# Patient Record
Sex: FEMALE | URBAN - METROPOLITAN AREA
[De-identification: names, ages, dates, MRNs, and addresses within clinical notes are randomized per-mention and may not be internally consistent; named-entity substitution may affect disease eponyms.]

---

## 2023-06-09 ENCOUNTER — APPOINTMENT (RX ONLY)
Dept: URBAN - METROPOLITAN AREA CLINIC 55 | Facility: CLINIC | Age: 33
Setting detail: DERMATOLOGY
End: 2023-06-09

## 2023-06-09 DIAGNOSIS — Z41.9 ENCOUNTER FOR PROCEDURE FOR PURPOSES OTHER THAN REMEDYING HEALTH STATE, UNSPECIFIED: ICD-10-CM

## 2023-06-09 PROCEDURE — ? COSMETIC CONSULTATION: GENERAL

## 2023-06-09 PROCEDURE — ? INVENTORY

## 2023-07-21 ENCOUNTER — APPOINTMENT (RX ONLY)
Dept: URBAN - METROPOLITAN AREA CLINIC 55 | Facility: CLINIC | Age: 33
Setting detail: DERMATOLOGY
End: 2023-07-21

## 2023-07-21 DIAGNOSIS — Z41.9 ENCOUNTER FOR PROCEDURE FOR PURPOSES OTHER THAN REMEDYING HEALTH STATE, UNSPECIFIED: ICD-10-CM

## 2023-07-21 PROCEDURE — ? INVENTORY

## 2023-07-21 PROCEDURE — ? PALOMAR VECTUS LASER HAIR REMOVAL

## 2023-07-21 NOTE — PROCEDURE: PALOMAR VECTUS LASER HAIR REMOVAL
Rate (Hz): Medium
Treatment Number: 0
Post-Care Instructions: I reviewed with the patient in detail post-care instructions. Patient should avoid the sun before and after treatment.
Fluence In J/Cm2: 17/26
Pre-Procedure: Prior to proceeding the treatment areas were cleaned and all present put on their eye protection.
Laser Type: diode 810nm
Consent obtained, risks reviewed.
Render Post-Care In The Note: No
Optic Size: 12 x 12mm
Post-Procedure Care: Immediate endpoint: perifollicular erythema and edema. Post care was reviewed with the patient and all patient questions were answered to their satisfaction.
Detail Level: Detailed

## 2023-09-13 ENCOUNTER — APPOINTMENT (RX ONLY)
Dept: URBAN - METROPOLITAN AREA CLINIC 55 | Facility: CLINIC | Age: 33
Setting detail: DERMATOLOGY
End: 2023-09-13

## 2023-09-13 DIAGNOSIS — Z41.9 ENCOUNTER FOR PROCEDURE FOR PURPOSES OTHER THAN REMEDYING HEALTH STATE, UNSPECIFIED: ICD-10-CM

## 2023-09-13 PROCEDURE — ? INVENTORY

## 2023-09-13 PROCEDURE — ? PALOMAR VECTUS LASER HAIR REMOVAL

## 2023-09-13 NOTE — PROCEDURE: PALOMAR VECTUS LASER HAIR REMOVAL
Optic Size: 12 x 12mm
Detail Level: Detailed
Render Post-Care In The Note: No
Pre-Procedure: Prior to proceeding the treatment areas were cleaned and all present put on their eye protection.
Consent obtained, risks reviewed.
External Cooling Fan Speed: 0
Post-Procedure Care: Immediate endpoint: perifollicular erythema and edema. Post care was reviewed with the patient and all patient questions were answered to their satisfaction.
Laser Type: diode 810nm
Rate (Hz): Medium
Fluence In J/Cm2: 17/26
Post-Care Instructions: I reviewed with the patient in detail post-care instructions. Patient should avoid the sun before and after treatment.

## 2023-10-27 ENCOUNTER — APPOINTMENT (RX ONLY)
Dept: URBAN - METROPOLITAN AREA CLINIC 55 | Facility: CLINIC | Age: 33
Setting detail: DERMATOLOGY
End: 2023-10-27

## 2023-10-27 DIAGNOSIS — Z41.9 ENCOUNTER FOR PROCEDURE FOR PURPOSES OTHER THAN REMEDYING HEALTH STATE, UNSPECIFIED: ICD-10-CM

## 2023-10-27 PROCEDURE — ? INVENTORY

## 2023-10-27 PROCEDURE — ? PALOMAR VECTUS LASER HAIR REMOVAL

## 2023-10-27 NOTE — PROCEDURE: PALOMAR VECTUS LASER HAIR REMOVAL
Treatment Number: 0
Fluence In J/Cm2: 14/21
Render Post-Care In The Note: No
Detail Level: Detailed
Consent obtained, risks reviewed.
Optic Size: 12 x 12mm
Rate (Hz): Medium
Pre-Procedure: Prior to proceeding the treatment areas were cleaned and all present put on their eye protection.
Laser Type: diode 810nm
Post-Procedure Care: Immediate endpoint: perifollicular erythema and edema. Post care was reviewed with the patient and all patient questions were answered to their satisfaction.
Post-Care Instructions: I reviewed with the patient in detail post-care instructions. Patient should avoid the sun before and after treatment.

## 2023-12-13 ENCOUNTER — APPOINTMENT (RX ONLY)
Dept: URBAN - METROPOLITAN AREA CLINIC 55 | Facility: CLINIC | Age: 33
Setting detail: DERMATOLOGY
End: 2023-12-13

## 2023-12-13 DIAGNOSIS — Z41.9 ENCOUNTER FOR PROCEDURE FOR PURPOSES OTHER THAN REMEDYING HEALTH STATE, UNSPECIFIED: ICD-10-CM

## 2023-12-13 PROCEDURE — ? INVENTORY

## 2023-12-13 PROCEDURE — ? PALOMAR VECTUS LASER HAIR REMOVAL

## 2023-12-13 NOTE — PROCEDURE: PALOMAR VECTUS LASER HAIR REMOVAL
Consent obtained, risks reviewed.
Treatment Number: 0
Pre-Procedure: Prior to proceeding the treatment areas were cleaned and all present put on their eye protection.
Rate (Hz): Medium
Detail Level: Detailed
Optic Size: 12 x 12mm
Laser Type: diode 810nm
Post-Care Instructions: I reviewed with the patient in detail post-care instructions. Patient should avoid the sun before and after treatment.
Post-Procedure Care: Immediate endpoint: perifollicular erythema and edema. Post care was reviewed with the patient and all patient questions were answered to their satisfaction.
Fluence In J/Cm2: 18/27
Render Post-Care In The Note: No

## 2024-01-18 ENCOUNTER — APPOINTMENT (RX ONLY)
Dept: URBAN - METROPOLITAN AREA CLINIC 55 | Facility: CLINIC | Age: 34
Setting detail: DERMATOLOGY
End: 2024-01-18

## 2024-01-18 DIAGNOSIS — Z41.9 ENCOUNTER FOR PROCEDURE FOR PURPOSES OTHER THAN REMEDYING HEALTH STATE, UNSPECIFIED: ICD-10-CM

## 2024-01-18 PROCEDURE — ? INVENTORY

## 2024-01-18 PROCEDURE — ? PALOMAR VECTUS LASER HAIR REMOVAL

## 2024-01-18 NOTE — PROCEDURE: PALOMAR VECTUS LASER HAIR REMOVAL
# Of Treatments In Package: 0
Fluence In J/Cm2: 18/27
Post-Procedure Care: Immediate endpoint: perifollicular erythema and edema. Post care was reviewed with the patient and all patient questions were answered to their satisfaction.
Post-Care Instructions: I reviewed with the patient in detail post-care instructions. Patient should avoid the sun before and after treatment.
Optic Size: 12 x 12mm
Rate (Hz): Medium
Laser Type: diode 810nm
Render Post-Care In The Note: No
Pre-Procedure: Prior to proceeding the treatment areas were cleaned and all present put on their eye protection.
Detail Level: Detailed
Consent obtained, risks reviewed.

## 2024-03-06 ENCOUNTER — APPOINTMENT (RX ONLY)
Dept: URBAN - METROPOLITAN AREA CLINIC 55 | Facility: CLINIC | Age: 34
Setting detail: DERMATOLOGY
End: 2024-03-06

## 2024-03-06 DIAGNOSIS — Z41.9 ENCOUNTER FOR PROCEDURE FOR PURPOSES OTHER THAN REMEDYING HEALTH STATE, UNSPECIFIED: ICD-10-CM

## 2024-03-06 PROCEDURE — ? INVENTORY

## 2024-03-06 PROCEDURE — ? PALOMAR VECTUS LASER HAIR REMOVAL

## 2024-03-06 NOTE — PROCEDURE: PALOMAR VECTUS LASER HAIR REMOVAL
Rate (Hz): Medium
Render Post-Care In The Note: No
Optic Size: 23 x 38mm
Laser Type: diode 810nm
Detail Level: Detailed
Pre-Procedure: Prior to proceeding the treatment areas were cleaned and all present put on their eye protection.
Consent obtained, risks reviewed.
Treatment Number: 0
Post-Care Instructions: I reviewed with the patient in detail post-care instructions. Patient should avoid the sun before and after treatment.
Fluence In J/Cm2: 54/12
Post-Procedure Care: Immediate endpoint: perifollicular erythema and edema. Post care was reviewed with the patient and all patient questions were answered to their satisfaction.

## 2024-08-30 ENCOUNTER — APPOINTMENT (RX ONLY)
Dept: URBAN - METROPOLITAN AREA CLINIC 55 | Facility: CLINIC | Age: 34
Setting detail: DERMATOLOGY
End: 2024-08-30

## 2024-08-30 DIAGNOSIS — Z41.9 ENCOUNTER FOR PROCEDURE FOR PURPOSES OTHER THAN REMEDYING HEALTH STATE, UNSPECIFIED: ICD-10-CM

## 2024-08-30 PROCEDURE — ? INVENTORY

## 2024-08-30 PROCEDURE — ? BOTOX

## 2024-08-30 PROCEDURE — ? COSMETIC CONSULTATION: GENERAL

## 2024-08-30 NOTE — PROCEDURE: BOTOX
Mentalis Units: 0
Show Periorbital Units: Yes
Additional Area 3 Location: lower lip
Detail Level: Detailed
Show Ucl Units: No
Glabellar Complex Units: 6
Forehead Units: 7
Show Inventory Tab: Show
Additional Area 2 Location: chin
Additional Area 2 Units: 2
Consent: Verbal consent obtained. Risks include but not limited to lid/brow ptosis, bruising, swelling, diplopia, temporary effect, incomplete chemical denervation.
Additional Area 1 Location: upper lip
Periorbital Skin Units: 8
Dilution (U/0.1 Cc): 4
Post-Care Instructions: Patient instructed to not lie down for 4 hours and limit physical activity for 24 hours.
Incrementing Botox Units: By 0.5 Units

## 2024-10-10 ENCOUNTER — APPOINTMENT (RX ONLY)
Dept: URBAN - METROPOLITAN AREA CLINIC 55 | Facility: CLINIC | Age: 34
Setting detail: DERMATOLOGY
End: 2024-10-10

## 2024-10-10 DIAGNOSIS — Z41.9 ENCOUNTER FOR PROCEDURE FOR PURPOSES OTHER THAN REMEDYING HEALTH STATE, UNSPECIFIED: ICD-10-CM

## 2024-10-10 PROCEDURE — ? INVENTORY

## 2024-10-10 PROCEDURE — ? PALOMAR VECTUS LASER HAIR REMOVAL

## 2024-10-10 NOTE — PROCEDURE: PALOMAR VECTUS LASER HAIR REMOVAL
Skintel Number (Optional): 21
Cooling: DCD setting
Rate (Hz): Medium
Fluence In J/Cm2: 25
Consent obtained, risks reviewed.
Laser Type: diode 810nm
Post-Procedure Care: Immediate endpoint: perifollicular erythema and edema. Post care was reviewed with the patient and all patient questions were answered to their satisfaction.
Detail Level: Detailed
# Of Treatments In Package: 0
Render Post-Care In The Note: No
Treatment Number: 6
Optic Size: 12 x 12mm
Pulse Duration (Include Units): 16 ms
Topical Anesthesia Type: 23% lidocaine, 7% tetracaine
Pre-Procedure: Prior to proceeding the treatment areas were cleaned and all present put on their eye protection.
Post-Care Instructions: I reviewed with the patient in detail post-care instructions. Patient should avoid sun for a minimum of 4 weeks before and after treatment.
Treatment Number: 7
Skintel Number (Optional): 19

## 2024-11-21 ENCOUNTER — APPOINTMENT (RX ONLY)
Dept: URBAN - METROPOLITAN AREA CLINIC 55 | Facility: CLINIC | Age: 34
Setting detail: DERMATOLOGY
End: 2024-11-21

## 2024-11-21 DIAGNOSIS — Z41.9 ENCOUNTER FOR PROCEDURE FOR PURPOSES OTHER THAN REMEDYING HEALTH STATE, UNSPECIFIED: ICD-10-CM

## 2024-11-21 PROCEDURE — ? INVENTORY

## 2024-11-21 PROCEDURE — ? BOTOX

## 2024-11-21 PROCEDURE — ? PALOMAR VECTUS LASER HAIR REMOVAL

## 2024-11-21 NOTE — PROCEDURE: BOTOX
Detail Level: Detailed
R Brow Units: 0
Show Forehead Units: Yes
Additional Area 1 Location: upper lip
Incrementing Botox Units: By 0.5 Units
Forehead Units: 7
Show Right And Left Pupillary Line Units: No
Show Inventory Tab: Show
Glabellar Complex Units: 6
Dilution (U/0.1 Cc): 4
Additional Area 3 Location: lower lip
Consent: Verbal consent obtained. Risks include but not limited to lid/brow ptosis, bruising, swelling, diplopia, temporary effect, incomplete chemical denervation.
Additional Area 2 Units: 2
Periorbital Skin Units: 8
Additional Area 2 Location: chin
Post-Care Instructions: Patient instructed to not lie down for 4 hours and limit physical activity for 24 hours.

## 2024-11-21 NOTE — PROCEDURE: PALOMAR VECTUS LASER HAIR REMOVAL
Treatment Number: 7
Optic Size: 12 x 12mm
Post-Care Instructions: I reviewed with the patient in detail post-care instructions. Patient should avoid sun for a minimum of 4 weeks before and after treatment.
Pulse Duration (Include Units): 16 ms
Pre-Procedure: Prior to proceeding the treatment areas were cleaned and all present put on their eye protection.
Detail Level: Detailed
External Cooling Fan Speed: 0
Post-Procedure Care: Immediate endpoint: perifollicular erythema and edema. Post care was reviewed with the patient and all patient questions were answered to their satisfaction.
Render Post-Care In The Note: No
Laser Type: diode 810nm
Consent obtained, risks reviewed.
Fluence In J/Cm2: 25
Rate (Hz): Medium
Cooling: DCD setting
Skintel Number (Optional): 23
Topical Anesthesia Type: 23% lidocaine, 7% tetracaine
Skintel Number (Optional): 17
Treatment Number: 8